# Patient Record
Sex: MALE | Race: WHITE | URBAN - METROPOLITAN AREA
[De-identification: names, ages, dates, MRNs, and addresses within clinical notes are randomized per-mention and may not be internally consistent; named-entity substitution may affect disease eponyms.]

---

## 2022-09-14 ENCOUNTER — OFFICE VISIT (OUTPATIENT)
Dept: URBAN - METROPOLITAN AREA CLINIC 19 | Facility: CLINIC | Age: 52
End: 2022-09-14
Payer: COMMERCIAL

## 2022-09-14 ENCOUNTER — WEB ENCOUNTER (OUTPATIENT)
Dept: URBAN - METROPOLITAN AREA CLINIC 19 | Facility: CLINIC | Age: 52
End: 2022-09-14

## 2022-09-14 VITALS
DIASTOLIC BLOOD PRESSURE: 84 MMHG | HEIGHT: 68 IN | SYSTOLIC BLOOD PRESSURE: 132 MMHG | BODY MASS INDEX: 29.25 KG/M2 | WEIGHT: 193 LBS | OXYGEN SATURATION: 98 % | HEART RATE: 82 BPM | TEMPERATURE: 97.7 F

## 2022-09-14 DIAGNOSIS — R19.7 DIARRHEA, UNSPECIFIED TYPE: ICD-10-CM

## 2022-09-14 DIAGNOSIS — K58.0 IRRITABLE BOWEL SYNDROME WITH DIARRHEA: ICD-10-CM

## 2022-09-14 DIAGNOSIS — K59.09 CHRONIC CONSTIPATION: ICD-10-CM

## 2022-09-14 DIAGNOSIS — R10.32 LLQ ABDOMINAL PAIN: ICD-10-CM

## 2022-09-14 DIAGNOSIS — K58.2 IRRITABLE BOWEL SYNDROME WITH ALTERNATING BOWEL HABITS: ICD-10-CM

## 2022-09-14 PROCEDURE — 99204 OFFICE O/P NEW MOD 45 MIN: CPT | Performed by: NURSE PRACTITIONER

## 2022-09-14 PROCEDURE — 99204 OFFICE O/P NEW MOD 45 MIN: CPT | Performed by: STUDENT IN AN ORGANIZED HEALTH CARE EDUCATION/TRAINING PROGRAM

## 2022-09-14 RX ORDER — PREGABALIN 25 MG/1
1 CAPSULE CAPSULE ORAL ONCE A DAY
Status: ACTIVE | COMMUNITY

## 2022-09-14 RX ORDER — AMITRIPTYLINE HYDROCHLORIDE 10 MG/1
1 TABLET AT BEDTIME TABLET, FILM COATED ORAL ONCE A DAY
Status: ACTIVE | COMMUNITY

## 2022-09-14 RX ORDER — TRAMADOL HYDROCHLORIDE 50 MG/1
1 TABLET AS NEEDED TABLET, FILM COATED ORAL ONCE A DAY
Status: ACTIVE | COMMUNITY

## 2022-09-14 NOTE — HPI-TODAY'S VISIT:
Crow is a 51 year old male who presents to the office for abdominal pain for 20 years. He complains of LLQ pain/cramping that is intermittent, severe, at random, better with BM.  Stools are every 3 days, loose to hard, difficult to pass. Denies rectal bleeding.   He is taking Levsin but it does not help his pain. He is taking OTC stool softerners, extra fiber and probiotics with no improvement.  He is gaining weight. He has piriformis syndrome  He has been by several GI Specialist- [place], and [facility] have not been able to find a cause and was told to go see pain specialist. Last Colonoscopy was 2020 with GI Specialist had a polyp, did have severe pain immediately after colonoscopy. I do not have official report of this. He has had multiple CT scans that are negative.  CT AP 7/2021 fatty liver, mildy enlarged prostate, mild fecal loading CT 3/2020 fatty liver, simple cyst left kidney,  Abd US 2020 fatty liver, partially obstructing intracalyceal calculus left kideny CT 2/2020 mild thickening and pericolonic stranding surrounding the rectum/possible proctitis, diverticulosis He has tried Bentyl,  He has never tried Xifaxan Celiac negative,

## 2022-09-15 LAB
A/G RATIO: 1.5
ALBUMIN: 4.5
ALKALINE PHOSPHATASE: 112
ALT (SGPT): 101
AST (SGOT): 53
BILIRUBIN, TOTAL: 0.4
BUN/CREATININE RATIO: (no result)
BUN: 18
C-REACTIVE PROTEIN, QUANT: 3.6
CALCIUM: 10.4
CARBON DIOXIDE, TOTAL: 25
CHLORIDE: 101
CREATININE: 1
EGFR: 91
GLOBULIN, TOTAL: 3
GLUCOSE: 179
HEMATOCRIT: 45.2
HEMOGLOBIN: 15.6
MCH: 30.1
MCHC: 34.5
MCV: 87.3
MPV: 11.2
PLATELET COUNT: 377
POTASSIUM: 4.9
PROTEIN, TOTAL: 7.5
RDW: 13.1
RED BLOOD CELL COUNT: 5.18
SED RATE BY MODIFIED: 31
SODIUM: 139
WHITE BLOOD CELL COUNT: 10.8

## 2022-09-20 ENCOUNTER — LAB OUTSIDE AN ENCOUNTER (OUTPATIENT)
Dept: URBAN - METROPOLITAN AREA CLINIC 19 | Facility: CLINIC | Age: 52
End: 2022-09-20

## 2022-09-23 ENCOUNTER — TELEPHONE ENCOUNTER (OUTPATIENT)
Dept: URBAN - METROPOLITAN AREA CLINIC 19 | Facility: CLINIC | Age: 52
End: 2022-09-23

## 2022-09-23 LAB
C-REACTIVE PROTEIN, QUANT: (no result)
CALPROTECTIN, FECAL: 80
GLUCOSE: (no result)
WHITE BLOOD CELL COUNT: (no result)

## 2022-09-26 ENCOUNTER — TELEPHONE ENCOUNTER (OUTPATIENT)
Dept: URBAN - METROPOLITAN AREA CLINIC 19 | Facility: CLINIC | Age: 52
End: 2022-09-26

## 2022-09-26 ENCOUNTER — TELEPHONE ENCOUNTER (OUTPATIENT)
Dept: URBAN - METROPOLITAN AREA CLINIC 96 | Facility: CLINIC | Age: 52
End: 2022-09-26

## 2022-09-26 RX ORDER — RIFAXIMIN 550 MG/1
1 TABLET TABLET ORAL THREE TIMES A DAY
Qty: 42 TABLET | Refills: 0 | OUTPATIENT
Start: 2022-09-26 | End: 2022-10-10

## 2022-09-26 RX ORDER — AMITRIPTYLINE HYDROCHLORIDE 10 MG/1
1 TABLET AT BEDTIME TABLET, FILM COATED ORAL ONCE A DAY
Status: ACTIVE | COMMUNITY

## 2022-09-26 RX ORDER — RIFAXIMIN 550 MG/1
1 TABLET TABLET ORAL THREE TIMES A DAY
Qty: 42 TABLET | Refills: 0 | Status: ACTIVE | COMMUNITY
Start: 2022-09-26 | End: 2022-10-10

## 2022-09-26 RX ORDER — TRAMADOL HYDROCHLORIDE 50 MG/1
1 TABLET AS NEEDED TABLET, FILM COATED ORAL ONCE A DAY
Status: ACTIVE | COMMUNITY

## 2022-09-26 RX ORDER — PREGABALIN 25 MG/1
1 CAPSULE CAPSULE ORAL ONCE A DAY
Status: ACTIVE | COMMUNITY

## 2022-09-26 NOTE — HPI-TODAY'S VISIT:
Spoke with patient, LFT's were elevated, he has been told he had fatty liver in the past. Will need to address at FU. ESR was elevated, CRP normal, fecal calprotectin borderline. Will try him on Xifaxan for 2 weeks.

## 2022-09-28 ENCOUNTER — TELEPHONE ENCOUNTER (OUTPATIENT)
Dept: URBAN - METROPOLITAN AREA CLINIC 19 | Facility: CLINIC | Age: 52
End: 2022-09-28

## 2022-09-29 ENCOUNTER — TELEPHONE ENCOUNTER (OUTPATIENT)
Dept: URBAN - METROPOLITAN AREA CLINIC 19 | Facility: CLINIC | Age: 52
End: 2022-09-29

## 2022-10-13 ENCOUNTER — TELEPHONE ENCOUNTER (OUTPATIENT)
Dept: URBAN - METROPOLITAN AREA CLINIC 19 | Facility: CLINIC | Age: 52
End: 2022-10-13

## 2022-10-24 ENCOUNTER — TELEPHONE ENCOUNTER (OUTPATIENT)
Dept: URBAN - METROPOLITAN AREA CLINIC 19 | Facility: CLINIC | Age: 52
End: 2022-10-24

## 2022-10-25 ENCOUNTER — LAB OUTSIDE AN ENCOUNTER (OUTPATIENT)
Dept: URBAN - METROPOLITAN AREA CLINIC 19 | Facility: CLINIC | Age: 52
End: 2022-10-25

## 2022-10-25 PROBLEM — 197125005: Status: ACTIVE | Noted: 2022-09-14

## 2022-10-31 PROBLEM — 14760008: Status: ACTIVE | Noted: 2022-09-14

## 2022-11-15 ENCOUNTER — DASHBOARD ENCOUNTERS (OUTPATIENT)
Age: 52
End: 2022-11-15

## 2022-11-16 ENCOUNTER — OFFICE VISIT (OUTPATIENT)
Dept: URBAN - METROPOLITAN AREA CLINIC 19 | Facility: CLINIC | Age: 52
End: 2022-11-16

## 2022-11-16 RX ORDER — PREGABALIN 25 MG/1
1 CAPSULE CAPSULE ORAL ONCE A DAY
Status: ACTIVE | COMMUNITY

## 2022-11-16 RX ORDER — TRAMADOL HYDROCHLORIDE 50 MG/1
1 TABLET AS NEEDED TABLET, FILM COATED ORAL ONCE A DAY
Status: ACTIVE | COMMUNITY

## 2022-11-16 RX ORDER — AMITRIPTYLINE HYDROCHLORIDE 10 MG/1
1 TABLET AT BEDTIME TABLET, FILM COATED ORAL ONCE A DAY
Status: ACTIVE | COMMUNITY

## 2022-11-18 ENCOUNTER — OFFICE VISIT (OUTPATIENT)
Dept: URBAN - METROPOLITAN AREA SURGERY CENTER 31 | Facility: SURGERY CENTER | Age: 52
End: 2022-11-18

## 2022-12-01 ENCOUNTER — OFFICE VISIT (OUTPATIENT)
Dept: URBAN - METROPOLITAN AREA SURGERY CENTER 31 | Facility: SURGERY CENTER | Age: 52
End: 2022-12-01